# Patient Record
Sex: FEMALE | Race: WHITE
[De-identification: names, ages, dates, MRNs, and addresses within clinical notes are randomized per-mention and may not be internally consistent; named-entity substitution may affect disease eponyms.]

---

## 2020-01-22 ENCOUNTER — HOSPITAL ENCOUNTER (EMERGENCY)
Dept: HOSPITAL 54 - ER | Age: 25
Discharge: HOME | End: 2020-01-22
Payer: SELF-PAY

## 2020-01-22 VITALS — BODY MASS INDEX: 21.26 KG/M2 | HEIGHT: 63 IN | WEIGHT: 120 LBS

## 2020-01-22 VITALS — DIASTOLIC BLOOD PRESSURE: 73 MMHG | SYSTOLIC BLOOD PRESSURE: 121 MMHG

## 2020-01-22 DIAGNOSIS — J11.1: Primary | ICD-10-CM

## 2020-01-22 NOTE — NUR
patient came in to the ER c/o cough, congestion, body ache on room air, 
breathing evenly and unlabored. kept comfortable, will continue to monitor 
accordingly.